# Patient Record
Sex: MALE | Race: WHITE | URBAN - NONMETROPOLITAN AREA
[De-identification: names, ages, dates, MRNs, and addresses within clinical notes are randomized per-mention and may not be internally consistent; named-entity substitution may affect disease eponyms.]

---
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
## 2022-03-09 ENCOUNTER — OFFICE VISIT (OUTPATIENT)
Dept: URBAN - NONMETROPOLITAN AREA CLINIC 13 | Facility: CLINIC | Age: 22
End: 2022-03-09

## 2022-05-11 ENCOUNTER — OFFICE VISIT (OUTPATIENT)
Dept: URBAN - NONMETROPOLITAN AREA CLINIC 13 | Facility: CLINIC | Age: 22
End: 2022-05-11
Payer: COMMERCIAL

## 2022-05-11 DIAGNOSIS — K59.00 CONSTIPATION IN MALE: ICD-10-CM

## 2022-05-11 DIAGNOSIS — K29.70 GASTRITIS WITHOUT BLEEDING, UNSPECIFIED CHRONICITY, UNSPECIFIED GASTRITIS TYPE: ICD-10-CM

## 2022-05-11 DIAGNOSIS — K21.00 GASTROESOPHAGEAL REFLUX DISEASE WITH ESOPHAGITIS WITHOUT HEMORRHAGE: ICD-10-CM

## 2022-05-11 DIAGNOSIS — R14.0 ABDOMINAL BLOATING: ICD-10-CM

## 2022-05-11 DIAGNOSIS — R17 TOTAL BILIRUBIN, ELEVATED: ICD-10-CM

## 2022-05-11 PROCEDURE — 99213 OFFICE O/P EST LOW 20 MIN: CPT | Performed by: INTERNAL MEDICINE

## 2022-05-11 NOTE — HPI-TODAY'S VISIT:
4/28/2021: Initial Gastroenterology Clinic Visit        20 year old gentleman with no significant past medical history presenting for evaluation of abdominal fullness, weight loss, diarrhea.   Mr. Falk was in his usual state of health until February 2021 when he began to notice decreased appetite. He noted early satiety. He was unable to associate the fullness with any particular food. Due to the fullness, he had decrease in oral intake. He has lost approximatley 16 pounds since February 2021. He denies nausea, vomiting. He does have loose bowel movements which can occur 4-5x per day. He does note that after the consumption of food having the urge to use the bathroom. Denies melena, hematochezia. Denies abdominal pain.  He denies anxiety or stress.  Denies new medications or changes to diet. He most recently traveled to [place] in February 2021 but does not recall a recent illness.   He underwent laboratory evaluation in February 2021 which showed a normal CRP, normal ESR, normal fecal calprotectin.  4/28/2021: CBC normal. Chemistry panel normal. AST normal, ALT normal, alkaline phosphatase normal, total bilirubin elevated at 1.9. TSH normal. Celiac serologies normal. 6/10/2021: EGD Esophagus normal. Proximal and distal esophageal biopsies obtained. Bilious fluid in the stomach. Erythematous mucosa in the gastric antrum. Biopsied. Cardia and gastric fundus were normal on retroflexion. The examined duodenum was normal. Biopsied.  6/10/2021: Pathology from EGD A Duodenum, biopsy Small bowel mucosa with no significant histopathology. No histologic evidence of celiac sprue or infectious microorganisms. B Stomach, antrum, biopsy Chronic gastritis. No Helicobacter pylori microorganisms are identified with a special stain. C Distal esophagus, biopsy Squamous epithelium with features suggestive of reflux esophagitis. No glandular epithelium is present. An Alcian blue/PAS stain is negative for both intestinal metaplasia and fungal elements. Intraepithelial eosinophils are not increased. D Proximal esophagus, biopsy Squamous epithelium with mild chronic inflammation. No glandular epithelium is present. An Alcian blue/PAS stain is negative for both intestinal 6/10/2021: Colonoscopy  The colon revealed moderately excessive looping requiring manual pressure to advance the colonoscope. Area of mucosa in the ileum was erythematous. Biopsied.  Stool was found in the transverse colon limiting visualization. Lavage of the area was performed using a large amount of normal saline with inadequate visualization. 6 mm polyp in the sigmoid colon. Polypectomy performed. Of the visualized mucosa, the colonic mucosa appeared normal. Biopsied. 6/10/2021: Pathology from Colonoscopy E Sigmoid colon, polypectomy Fragments of benign colonic mucosa with lymphoid aggregates. F Terminal ileum, biopsy Small bowel mucosa with no significant histopathology. No histologic evidence of active or chronic ileitis. G Random colon, biopsy Colonic mucosa with no significant histopathology. No histologic evidence of active, chronic or microscopic colitis.  12/28/2021: Gastroenterology Follow-Up Visit Mr. Falk presents for follow-up. He notes that his weight has returned to normal. A vast majority of his symptoms have returned to his baseline. He is able to tolerate increased oral intake without abdominal bloating. He still notes that his bowel movements vary. He does sit on the toilet for an extended period of time before passing a bowel movement. When he does pass a bowel movement, he has improvement of symptoms.  12/28/2021: Helicobacter pylori serologies negative.  12/28/2021: Total bilirubin returned to normal.   5/11/2022: Gastroenterology Follow-Up Visit  Mr. Falk was on polyethylene glycol following [facility] visit in 12/2021. This improved his constipation but recently has not required hte need for polyethylene glycol. Denies melena, hematochezia, abdominal discomfort. Weight has been stable.

## 2022-05-23 ENCOUNTER — DASHBOARD ENCOUNTERS (OUTPATIENT)
Age: 22
End: 2022-05-23

## 2022-05-23 PROBLEM — 4556007: Status: ACTIVE | Noted: 2021-12-28

## 2022-05-23 PROBLEM — 266433003: Status: ACTIVE | Noted: 2021-12-31

## 2022-05-23 PROBLEM — 116289008: Status: ACTIVE | Noted: 2021-12-31

## 2022-05-23 PROBLEM — 14760008: Status: ACTIVE | Noted: 2021-12-31

## 2022-05-23 PROBLEM — 176271000119108: Status: ACTIVE | Noted: 2021-12-28
